# Patient Record
Sex: FEMALE | Race: WHITE | NOT HISPANIC OR LATINO | URBAN - METROPOLITAN AREA
[De-identification: names, ages, dates, MRNs, and addresses within clinical notes are randomized per-mention and may not be internally consistent; named-entity substitution may affect disease eponyms.]

---

## 2018-03-28 ENCOUNTER — TELEPHONE (OUTPATIENT)
Dept: FAMILY MEDICINE | Facility: CLINIC | Age: 23
End: 2018-03-28

## 2018-03-28 RX ORDER — TIMOLOL MALEATE 10 MG/1
1 TABLET ORAL DAILY
COMMUNITY
Start: 2018-01-31 | End: 2018-03-28 | Stop reason: SDUPTHER

## 2018-03-28 RX ORDER — TIMOLOL MALEATE 10 MG/1
10 TABLET ORAL DAILY
Qty: 90 TABLET | Refills: 1 | Status: SHIPPED | OUTPATIENT
Start: 2018-03-28 | End: 2018-10-08

## 2018-10-08 ENCOUNTER — OFFICE VISIT (OUTPATIENT)
Dept: FAMILY MEDICINE | Facility: CLINIC | Age: 23
End: 2018-10-08
Payer: OTHER GOVERNMENT

## 2018-10-08 VITALS
SYSTOLIC BLOOD PRESSURE: 126 MMHG | TEMPERATURE: 98.5 F | BODY MASS INDEX: 22.66 KG/M2 | OXYGEN SATURATION: 99 % | WEIGHT: 141 LBS | HEIGHT: 66 IN | DIASTOLIC BLOOD PRESSURE: 76 MMHG

## 2018-10-08 DIAGNOSIS — Z00.00 ENCOUNTER FOR GENERAL ADULT MEDICAL EXAMINATION WITHOUT ABNORMAL FINDINGS: Primary | ICD-10-CM

## 2018-10-08 PROCEDURE — 99395 PREV VISIT EST AGE 18-39: CPT | Performed by: FAMILY MEDICINE

## 2018-10-08 RX ORDER — NORGESTIMATE AND ETHINYL ESTRADIOL 7DAYSX3 28
KIT ORAL
COMMUNITY
Start: 2018-08-03 | End: 2020-02-04

## 2018-10-08 ASSESSMENT — ENCOUNTER SYMPTOMS
PALPITATIONS: 0
DIZZINESS: 0
FEVER: 0
EYE PAIN: 0
CONSTIPATION: 0
SORE THROAT: 0
WHEEZING: 0
SHORTNESS OF BREATH: 0
CHILLS: 0
HEMATURIA: 0
NAUSEA: 0
VOICE CHANGE: 0
NUMBNESS: 0
DYSURIA: 0
SINUS PAIN: 0
BACK PAIN: 0
COUGH: 0
NERVOUS/ANXIOUS: 0
VOMITING: 0
DIARRHEA: 0
ABDOMINAL PAIN: 0

## 2018-10-08 NOTE — PROGRESS NOTES
Subjective      Patient ID: Anahi Mcgrath is a 23 y.o. female.      HPI  Patient presents today for physical exam.    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      Social History Main Topics   • Smoking status: Never Smoker   • Smokeless tobacco: Never Used   • Alcohol use 0.6 oz/week     1 Glasses of wine per week      Comment: social   • Drug use: No   • Sexual activity: Yes     Partners: Male     Birth control/ protection: Other     Other Topics Concern   • Not on file     Social History Narrative   • No narrative on file       Family History   Problem Relation Age of Onset   • Arthritis Mother    • Pancreatic cancer Paternal Grandmother        Past Surgical History:   Procedure Laterality Date   • PILONIDAL CYST / SINUS EXCISION         History reviewed. No pertinent past medical history.    Goals     None          The following have been reviewed and updated as appropriate in this visit:  Tobacco  Med Hx  Surg Hx  Fam Hx  Soc Hx      Review of Systems   Constitutional: Negative for chills and fever.   HENT: Negative for ear pain, hearing loss, sinus pain, sneezing, sore throat and voice change.    Eyes: Negative for pain.   Respiratory: Negative for cough, shortness of breath and wheezing.    Cardiovascular: Negative for chest pain and palpitations.   Gastrointestinal: Negative for abdominal pain, constipation, diarrhea, nausea and vomiting.   Genitourinary: Negative for dysuria, hematuria, urgency and vaginal discharge.   Musculoskeletal: Negative for back pain.   Skin: Negative for rash.   Neurological: Negative for dizziness and numbness.   Psychiatric/Behavioral: The patient is not nervous/anxious.        Objective     Vitals:    10/08/18 1413   BP: 126/76   Temp: 36.9 °C (98.5 °F)   SpO2: 99%   Weight: 64 kg (141 lb)   Height:  "1.664 m (5' 5.5\")     Body mass index is 23.11 kg/m².    Physical Exam   Constitutional: She is oriented to person, place, and time. She appears well-developed and well-nourished.   HENT:   Head: Normocephalic and atraumatic.   Right Ear: External ear normal.   Left Ear: External ear normal.   Nose: Nose normal.   Mouth/Throat: Oropharynx is clear and moist. No oropharyngeal exudate.   Eyes: EOM are normal.   Neck: Normal range of motion. Neck supple. No thyromegaly present.   Cardiovascular: Normal rate, regular rhythm and normal heart sounds.  Exam reveals no gallop and no friction rub.    No murmur heard.  Pulmonary/Chest: Effort normal and breath sounds normal. No respiratory distress. She has no wheezes. She has no rales.   Abdominal: Soft. Bowel sounds are normal. She exhibits no mass. There is no tenderness. There is no guarding.   Musculoskeletal: Normal range of motion. She exhibits no edema or tenderness.   Lymphadenopathy:     She has no cervical adenopathy.   Neurological: She is alert and oriented to person, place, and time. No cranial nerve deficit.   Skin: Skin is warm and dry. No rash noted. No erythema.   Psychiatric: She has a normal mood and affect.       Assessment/Plan   Problem List Items Addressed This Visit     None      Visit Diagnoses     Encounter for general adult medical examination without abnormal findings    -  Primary    counseled diet/nutrition and exercise        Gyn - Dr. Oconnell - w4w - has appt 10/18/18  Had flu shot already this year  "

## 2020-02-03 ENCOUNTER — TRANSCRIBE ORDERS (OUTPATIENT)
Dept: SCHEDULING | Age: 25
End: 2020-02-03

## 2020-02-03 DIAGNOSIS — N60.01 SOLITARY CYST OF RIGHT BREAST: Primary | ICD-10-CM

## 2020-02-04 ENCOUNTER — OFFICE VISIT (OUTPATIENT)
Dept: FAMILY MEDICINE | Facility: CLINIC | Age: 25
End: 2020-02-04
Payer: OTHER GOVERNMENT

## 2020-02-04 VITALS
HEART RATE: 100 BPM | WEIGHT: 123 LBS | BODY MASS INDEX: 20.49 KG/M2 | HEIGHT: 65 IN | TEMPERATURE: 98.1 F | SYSTOLIC BLOOD PRESSURE: 114 MMHG | DIASTOLIC BLOOD PRESSURE: 70 MMHG | OXYGEN SATURATION: 98 %

## 2020-02-04 DIAGNOSIS — R53.83 OTHER FATIGUE: ICD-10-CM

## 2020-02-04 DIAGNOSIS — D50.9 IRON DEFICIENCY ANEMIA, UNSPECIFIED IRON DEFICIENCY ANEMIA TYPE: ICD-10-CM

## 2020-02-04 DIAGNOSIS — Z00.00 ENCOUNTER FOR GENERAL ADULT MEDICAL EXAMINATION WITHOUT ABNORMAL FINDINGS: Primary | ICD-10-CM

## 2020-02-04 PROCEDURE — 99395 PREV VISIT EST AGE 18-39: CPT | Performed by: FAMILY MEDICINE

## 2020-02-04 RX ORDER — ERENUMAB-AOOE 140 MG/ML
INJECTION, SOLUTION SUBCUTANEOUS
COMMUNITY
Start: 2020-01-01 | End: 2021-04-05

## 2020-02-04 RX ORDER — FERROUS SULFATE 325(65) MG
TABLET ORAL
COMMUNITY

## 2020-02-04 ASSESSMENT — ENCOUNTER SYMPTOMS
DIARRHEA: 0
NECK PAIN: 0
FEVER: 0
SINUS PAIN: 0
ABDOMINAL PAIN: 0
NECK STIFFNESS: 0
DYSURIA: 0
CHILLS: 0
WHEEZING: 0
VOMITING: 0
HEMATURIA: 0
VOICE CHANGE: 0
NERVOUS/ANXIOUS: 0
SHORTNESS OF BREATH: 0
BACK PAIN: 0
EYE PAIN: 0
NAUSEA: 0
SORE THROAT: 0
DIZZINESS: 0
CONSTIPATION: 0
PALPITATIONS: 0
NUMBNESS: 0
COUGH: 0

## 2020-02-04 NOTE — PROGRESS NOTES
Subjective      Patient ID: Anahi Mcgrath is a 24 y.o. female.      HPI  Patient presents today for physical exam.    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   Social Needs   • Financial resource strain: Not on file   • Food insecurity:     Worry: Not on file     Inability: Not on file   • Transportation needs:     Medical: Not on file     Non-medical: Not on file   Tobacco Use   • Smoking status: Never Smoker   • Smokeless tobacco: Never Used   Substance and Sexual Activity   • Alcohol use: Yes     Alcohol/week: 2.0 standard drinks     Types: 2 Glasses of wine per week     Comment: social   • Drug use: No   • Sexual activity: Yes     Partners: Male     Birth control/protection: OCP   Lifestyle   • Physical activity:     Days per week: Not on file     Minutes per session: Not on file   • Stress: Not on file   Relationships   • Social connections:     Talks on phone: Not on file     Gets together: Not on file     Attends Evangelical service: Not on file     Active member of club or organization: Not on file     Attends meetings of clubs or organizations: Not on file     Relationship status: Not on file   • Intimate partner violence:     Fear of current or ex partner: Not on file     Emotionally abused: Not on file     Physically abused: Not on file     Forced sexual activity: Not on file   Other Topics Concern   • Not on file   Social History Narrative   • Not on file       Family History   Problem Relation Age of Onset   • Arthritis Biological Mother    • Pancreatic cancer Paternal Grandmother    • COPD Maternal Grandmother    • Lung cancer Maternal Grandmother         SCLC       Past Surgical History:   Procedure Laterality Date   • PILONIDAL CYST / SINUS EXCISION    "      Past Medical History:   Diagnosis Date   • Anemia 11/18/19    ferritin: 11; %sat: 14       Goals    None         The following have been reviewed and updated as appropriate in this visit:  Tobacco  Allergies  Meds  Problems  Med Hx  Surg Hx  Fam Hx  Soc Hx        Review of Systems   Constitutional: Negative for chills and fever.   HENT: Negative for ear pain, hearing loss, sinus pain, sneezing, sore throat and voice change.    Eyes: Negative for pain.   Respiratory: Negative for cough, shortness of breath and wheezing.    Cardiovascular: Negative for chest pain and palpitations.   Gastrointestinal: Negative for abdominal pain, constipation, diarrhea, nausea and vomiting.   Genitourinary: Negative for dysuria, hematuria, urgency and vaginal discharge.   Musculoskeletal: Negative for back pain, neck pain and neck stiffness.   Skin: Negative for rash.   Neurological: Negative for dizziness and numbness.   Psychiatric/Behavioral: The patient is not nervous/anxious.        Objective     Vitals:    02/04/20 1819   BP: 114/70   Pulse: 100   Temp: 36.7 °C (98.1 °F)   SpO2: 98%   Weight: 55.8 kg (123 lb)   Height: 1.651 m (5' 5\")     Body mass index is 20.47 kg/m².    Physical Exam   Constitutional: She is oriented to person, place, and time. She appears well-developed and well-nourished.   HENT:   Head: Normocephalic and atraumatic.   Right Ear: External ear normal.   Left Ear: External ear normal.   Nose: Nose normal.   Mouth/Throat: Oropharynx is clear and moist. No oropharyngeal exudate.   Eyes: EOM are normal.   Neck: Normal range of motion. Neck supple. No thyromegaly present.   Cardiovascular: Normal rate, regular rhythm and normal heart sounds. Exam reveals no gallop and no friction rub.   No murmur heard.  Pulmonary/Chest: Effort normal and breath sounds normal. No stridor. No respiratory distress. She has no wheezes.   Abdominal: Soft. Bowel sounds are normal. She exhibits no mass. There is no " tenderness. There is no guarding.   Musculoskeletal: She exhibits no edema.   Neurological: She is alert and oriented to person, place, and time.   Skin: Skin is warm and dry. No rash noted. No erythema.   Psychiatric: She has a normal mood and affect. Her behavior is normal.       Assessment/Plan   Problem List Items Addressed This Visit     None      Visit Diagnoses     Encounter for general adult medical examination without abnormal findings    -  Primary    counseled diet/nutrition and exercise    Other fatigue        Relevant Orders    TSH w reflex FT4    CBC and Differential    Ferritin    Iron and TIBC    Iron deficiency anemia, unspecified iron deficiency anemia type        Relevant Medications    ferrous sulfate 325 mg (65 mg iron) tablet    Other Relevant Orders    TSH w reflex FT4    CBC and Differential    Ferritin    Iron and TIBC        Gyn - kyleena removed yesterday (after a year), pap 2019

## 2020-02-06 PROBLEM — G43.009 MIGRAINE WITHOUT AURA AND WITHOUT STATUS MIGRAINOSUS, NOT INTRACTABLE: Status: ACTIVE | Noted: 2020-02-06

## 2020-02-21 ENCOUNTER — HOSPITAL ENCOUNTER (OUTPATIENT)
Dept: RADIOLOGY | Age: 25
Discharge: HOME | End: 2020-02-21
Attending: OBSTETRICS & GYNECOLOGY
Payer: OTHER GOVERNMENT

## 2020-02-21 DIAGNOSIS — N60.01 SOLITARY CYST OF RIGHT BREAST: ICD-10-CM

## 2020-02-21 PROCEDURE — 76642 ULTRASOUND BREAST LIMITED: CPT | Mod: RT

## 2020-07-13 ENCOUNTER — TELEPHONE (OUTPATIENT)
Dept: FAMILY MEDICINE | Facility: CLINIC | Age: 25
End: 2020-07-13

## 2020-07-15 ENCOUNTER — OFFICE VISIT (OUTPATIENT)
Dept: FAMILY MEDICINE | Facility: CLINIC | Age: 25
End: 2020-07-15
Payer: OTHER GOVERNMENT

## 2020-07-15 DIAGNOSIS — Z11.1 SCREENING FOR TUBERCULOSIS: Primary | ICD-10-CM

## 2020-07-15 PROCEDURE — 86580 TB INTRADERMAL TEST: CPT | Performed by: FAMILY MEDICINE

## 2020-07-15 PROCEDURE — 99999 PR OFFICE/OUTPT VISIT,PROCEDURE ONLY: CPT | Mod: 25 | Performed by: FAMILY MEDICINE

## 2020-07-15 NOTE — PROGRESS NOTES
Pt aware to wait in office ten to fifteen minutes after administration for any immediate reactions.  AND TO COME BACK WITHIN 48-72 HOURS TO COME BACK FOR READING.

## 2020-07-17 LAB
INDURATION: 0 MM
TB SKIN TEST: NEGATIVE

## 2021-02-05 ENCOUNTER — TELEPHONE (OUTPATIENT)
Dept: FAMILY MEDICINE | Facility: CLINIC | Age: 26
End: 2021-02-05

## 2021-02-05 NOTE — TELEPHONE ENCOUNTER
Patient called [facility] school diagnostic appointment for a PPD .      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

## 2021-02-08 DIAGNOSIS — Z11.1 SCREENING FOR TUBERCULOSIS: Primary | ICD-10-CM

## 2021-02-09 ENCOUNTER — OFFICE VISIT (OUTPATIENT)
Dept: FAMILY MEDICINE | Facility: CLINIC | Age: 26
End: 2021-02-09
Payer: OTHER GOVERNMENT

## 2021-02-09 DIAGNOSIS — Z11.1 SCREENING FOR TUBERCULOSIS: Primary | ICD-10-CM

## 2021-02-09 PROCEDURE — 86580 TB INTRADERMAL TEST: CPT | Performed by: FAMILY MEDICINE

## 2021-02-09 PROCEDURE — 99999 PR OFFICE/OUTPT VISIT,PROCEDURE ONLY: CPT | Performed by: FAMILY MEDICINE

## 2021-03-26 ENCOUNTER — TELEPHONE (OUTPATIENT)
Dept: FAMILY MEDICINE | Facility: CLINIC | Age: 26
End: 2021-03-26

## 2021-03-26 DIAGNOSIS — Z11.1 SCREENING FOR TUBERCULOSIS: ICD-10-CM

## 2021-03-26 DIAGNOSIS — Z01.84 IMMUNITY STATUS TESTING: Primary | ICD-10-CM

## 2021-03-26 NOTE — TELEPHONE ENCOUNTER
Patient called said she wanted to schedule diagnostic appointment for the antibodies titers for Hep B, MMR, varicella, Quantiferon-TB Gold Test or T-spot and the TDap. 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

## 2021-03-26 NOTE — TELEPHONE ENCOUNTER
Pt needs to go to the lab for titers - not a diagnostic appointment - labs ordered   Once the titers are back we can figure out if she needs any boosters. At that time she can get her Tdap, but we can wait for now

## 2021-03-31 LAB
GAMMA INTERFERON BACKGROUND BLD IA-ACNC: 0.03 IU/ML
HBV SURFACE AB SERPL IA-ACNC: 41 MIU/ML
M TB IFN-G BLD-IMP: NEGATIVE
M TB IFN-G CD4+ BCKGRND COR BLD-ACNC: 0.04 IU/ML
MEV IGG SER IA-ACNC: >300 AU/ML
MITOGEN IGNF BCKGRD COR BLD-ACNC: >10 IU/ML
MUV IGG SER IA-ACNC: 116 AU/ML
QUEST TB2-NIL: 0.05 IU/ML
RUBV IGG SERPL IA-ACNC: 6.33 INDEX
VZV IGG SER IA-ACNC: 3354 INDEX

## 2021-04-05 ENCOUNTER — OFFICE VISIT (OUTPATIENT)
Dept: FAMILY MEDICINE | Facility: CLINIC | Age: 26
End: 2021-04-05
Payer: COMMERCIAL

## 2021-04-05 VITALS
DIASTOLIC BLOOD PRESSURE: 70 MMHG | SYSTOLIC BLOOD PRESSURE: 114 MMHG | TEMPERATURE: 96.8 F | BODY MASS INDEX: 21.66 KG/M2 | HEART RATE: 86 BPM | WEIGHT: 130 LBS | HEIGHT: 65 IN | OXYGEN SATURATION: 98 %

## 2021-04-05 DIAGNOSIS — Z00.00 ENCOUNTER FOR GENERAL ADULT MEDICAL EXAMINATION WITHOUT ABNORMAL FINDINGS: Primary | ICD-10-CM

## 2021-04-05 DIAGNOSIS — D50.9 IRON DEFICIENCY ANEMIA, UNSPECIFIED IRON DEFICIENCY ANEMIA TYPE: ICD-10-CM

## 2021-04-05 DIAGNOSIS — G43.009 MIGRAINE WITHOUT AURA AND WITHOUT STATUS MIGRAINOSUS, NOT INTRACTABLE: ICD-10-CM

## 2021-04-05 DIAGNOSIS — L65.9 THINNING HAIR: ICD-10-CM

## 2021-04-05 PROBLEM — D18.09 HEMANGIOMA OF OTHER SITES: Status: ACTIVE | Noted: 2021-04-05

## 2021-04-05 PROCEDURE — 3008F BODY MASS INDEX DOCD: CPT | Performed by: FAMILY MEDICINE

## 2021-04-05 PROCEDURE — 99395 PREV VISIT EST AGE 18-39: CPT | Performed by: FAMILY MEDICINE

## 2021-04-05 RX ORDER — RIMEGEPANT SULFATE 75 MG/75MG
TABLET, ORALLY DISINTEGRATING ORAL
COMMUNITY
Start: 2021-02-10

## 2021-04-05 RX ORDER — FREMANEZUMAB-VFRM 225 MG/1.5ML
INJECTION SUBCUTANEOUS
COMMUNITY
Start: 2021-01-06

## 2021-04-05 NOTE — PROGRESS NOTES
Subjective      Patient ID: Anahi Mcgrath is a 26 y.o. female.      HPI  Patient presents today for physical exam.    Social History     Socioeconomic History   • Marital status: Single     Spouse name: Not on file   • Number of children: Not on file   • Years of education: Not on file   • Highest education level: Not on file   Occupational History   • Occupation: [employer]   Social Needs   • Financial resource strain: Not on file   • Food insecurity     Worry: Not on file     Inability: Not on file   • Transportation needs     Medical: Not on file     Non-medical: Not on file   Tobacco Use   • Smoking status: Never Smoker   • Smokeless tobacco: Never Used   Substance and Sexual Activity   • Alcohol use: Yes     Alcohol/week: 2.0 standard drinks     Types: 2 Glasses of wine per week     Comment: social   • Drug use: No   • Sexual activity: Yes     Partners: Male     Birth control/protection: OCP   Lifestyle   • Physical activity     Days per week: Not on file     Minutes per session: Not on file   • Stress: Not on file   Relationships   • Social connections     Talks on phone: Not on file     Gets together: Not on file     Attends Cheondoism service: Not on file     Active member of club or organization: Not on file     Attends meetings of clubs or organizations: Not on file     Relationship status: Not on file   • Intimate partner violence     Fear of current or ex partner: Not on file     Emotionally abused: Not on file     Physically abused: Not on file     Forced sexual activity: Not on file   Other Topics Concern   • Not on file   Social History Narrative   • Not on file       Family History   Problem Relation Age of Onset   • Arthritis Biological Mother    • Pancreatic cancer Paternal Grandmother    • COPD Maternal Grandmother    • Lung cancer Maternal Grandmother         SCLC       Past Surgical History:   Procedure Laterality Date   • PILONIDAL CYST / SINUS EXCISION         Past Medical History:  "  Diagnosis Date   • Anemia 11/18/19    ferritin: 11; %sat: 14       Goals    None         The following have been reviewed and updated as appropriate in this visit:  Tobacco  Allergies  Meds  Problems  Med Hx  Surg Hx  Fam Hx  Soc Hx        Review of Systems   Constitutional: Negative for activity change, appetite change, fatigue and fever.   HENT: Negative for congestion, ear pain, postnasal drip, sore throat and trouble swallowing.    Eyes: Negative for pain.   Respiratory: Negative for cough, shortness of breath and wheezing.    Cardiovascular: Negative for chest pain.   Gastrointestinal: Negative for abdominal pain, constipation, diarrhea, nausea and vomiting.   Genitourinary: Negative for frequency and hematuria.   Musculoskeletal: Negative for arthralgias and back pain.   Skin: Negative for rash.   Neurological: Negative for dizziness and numbness.   Psychiatric/Behavioral: The patient is not nervous/anxious.        Objective     Vitals:    04/05/21 0901   BP: 114/70   Pulse: 86   Temp: (!) 36 °C (96.8 °F)   SpO2: 98%   Weight: 59 kg (130 lb)   Height: 1.651 m (5' 5\")     Body mass index is 21.63 kg/m².    Physical Exam  Constitutional:       Appearance: Normal appearance. She is well-developed and normal weight.   HENT:      Head: Normocephalic and atraumatic.   Neck:      Thyroid: No thyromegaly.   Cardiovascular:      Rate and Rhythm: Normal rate and regular rhythm.      Heart sounds: Normal heart sounds. No murmur. No gallop.    Pulmonary:      Effort: Pulmonary effort is normal. No respiratory distress.      Breath sounds: Normal breath sounds. No wheezing.   Musculoskeletal:      Right lower leg: No edema.      Left lower leg: No edema.   Skin:     General: Skin is warm and dry.      Findings: No erythema or rash.      Comments: Small hemangioma left occipital region above left ear     Neurological:      General: No focal deficit present.      Mental Status: She is alert and oriented to person, " place, and time. Mental status is at baseline.   Psychiatric:         Mood and Affect: Mood normal.         Behavior: Behavior normal.         Thought Content: Thought content normal.         Judgment: Judgment normal.         Assessment/Plan   Problem List Items Addressed This Visit        Nervous    Migraine without aura and without status migrainosus, not intractable     Now on ajovy  Still getting 1-2 times a month  Following with dr. Winston         Relevant Medications    AJOVY AUTOINJECTOR 225 mg/1.5 mL auto-injector injection    NURTEC ODT 75 mg tablet,disintegrating    Other Relevant Orders    CBC and Differential    Comprehensive metabolic panel    Lipid panel    TSH w reflex FT4    Iron and TIBC    Ferritin      Other Visit Diagnoses     Encounter for general adult medical examination without abnormal findings    -  Primary    counseled diet/nutrition and exercise    Relevant Orders    CBC and Differential    Comprehensive metabolic panel    Lipid panel    TSH w reflex FT4    Iron and TIBC    Ferritin    Thinning hair        Relevant Orders    CBC and Differential    Comprehensive metabolic panel    Lipid panel    TSH w reflex FT4    Iron and TIBC    Ferritin    Iron deficiency anemia, unspecified iron deficiency anemia type        Relevant Orders    CBC and Differential    Comprehensive metabolic panel    Lipid panel    TSH w reflex FT4    Iron and TIBC    Ferritin        Gyn - Dr. Oconnell - has appt tomorrow

## 2021-04-07 ASSESSMENT — ENCOUNTER SYMPTOMS
SHORTNESS OF BREATH: 0
NERVOUS/ANXIOUS: 0
DIZZINESS: 0
HEMATURIA: 0
ARTHRALGIAS: 0
BACK PAIN: 0
ACTIVITY CHANGE: 0
APPETITE CHANGE: 0
FEVER: 0
TROUBLE SWALLOWING: 0
VOMITING: 0
NUMBNESS: 0
WHEEZING: 0
CONSTIPATION: 0
NAUSEA: 0
DIARRHEA: 0
FATIGUE: 0
EYE PAIN: 0
FREQUENCY: 0
SORE THROAT: 0
COUGH: 0
ABDOMINAL PAIN: 0

## 2021-04-07 NOTE — ASSESSMENT & PLAN NOTE
Left side of scalp - recommend following up with dermatology for removal since brushing hair can cause it to bleed

## 2021-04-16 LAB
ALBUMIN SERPL-MCNC: 4.1 G/DL (ref 3.6–5.1)
ALBUMIN/GLOB SERPL: 1.8 (CALC) (ref 1–2.5)
ALP SERPL-CCNC: 47 U/L (ref 31–125)
ALT SERPL-CCNC: 14 U/L (ref 6–29)
AST SERPL-CCNC: 19 U/L (ref 10–30)
BASOPHILS # BLD AUTO: 30 CELLS/UL (ref 0–200)
BASOPHILS NFR BLD AUTO: 0.7 %
BILIRUB SERPL-MCNC: 0.9 MG/DL (ref 0.2–1.2)
BUN SERPL-MCNC: 12 MG/DL (ref 7–25)
BUN/CREAT SERPL: NORMAL (CALC) (ref 6–22)
CALCIUM SERPL-MCNC: 9.1 MG/DL (ref 8.6–10.2)
CHLORIDE SERPL-SCNC: 106 MMOL/L (ref 98–110)
CHOLEST SERPL-MCNC: 150 MG/DL
CHOLEST/HDLC SERPL: 2.5 (CALC)
CO2 SERPL-SCNC: 26 MMOL/L (ref 20–32)
CREAT SERPL-MCNC: 0.79 MG/DL (ref 0.5–1.1)
EOSINOPHIL # BLD AUTO: 82 CELLS/UL (ref 15–500)
EOSINOPHIL NFR BLD AUTO: 1.9 %
ERYTHROCYTE [DISTWIDTH] IN BLOOD BY AUTOMATED COUNT: 12.6 % (ref 11–15)
FERRITIN SERPL-MCNC: 29 NG/ML (ref 16–154)
GLOBULIN SER CALC-MCNC: 2.3 G/DL (CALC) (ref 1.9–3.7)
GLUCOSE SERPL-MCNC: 81 MG/DL (ref 65–99)
HCT VFR BLD AUTO: 44.7 % (ref 35–45)
HDLC SERPL-MCNC: 61 MG/DL
HGB BLD-MCNC: 15.1 G/DL (ref 11.7–15.5)
IRON SATN MFR SERPL: 59 % (CALC) (ref 16–45)
IRON SERPL-MCNC: 239 MCG/DL (ref 40–190)
LDLC SERPL CALC-MCNC: 73 MG/DL (CALC)
LYMPHOCYTES # BLD AUTO: 2120 CELLS/UL (ref 850–3900)
LYMPHOCYTES NFR BLD AUTO: 49.3 %
MCH RBC QN AUTO: 32.3 PG (ref 27–33)
MCHC RBC AUTO-ENTMCNC: 33.8 G/DL (ref 32–36)
MCV RBC AUTO: 95.5 FL (ref 80–100)
MONOCYTES # BLD AUTO: 310 CELLS/UL (ref 200–950)
MONOCYTES NFR BLD AUTO: 7.2 %
NEUTROPHILS # BLD AUTO: 1759 CELLS/UL (ref 1500–7800)
NEUTROPHILS NFR BLD AUTO: 40.9 %
NONHDLC SERPL-MCNC: 89 MG/DL (CALC)
PLATELET # BLD AUTO: 213 THOUSAND/UL (ref 140–400)
PMV BLD REES-ECKER: 11.3 FL (ref 7.5–12.5)
POTASSIUM SERPL-SCNC: 4.4 MMOL/L (ref 3.5–5.3)
PROT SERPL-MCNC: 6.4 G/DL (ref 6.1–8.1)
QUEST EGFR AFRICAN AMERICAN: 120 ML/MIN/1.73M2
QUEST EGFR NON-AFR. AMERICAN: 103 ML/MIN/1.73M2
RBC # BLD AUTO: 4.68 MILLION/UL (ref 3.8–5.1)
SODIUM SERPL-SCNC: 139 MMOL/L (ref 135–146)
TIBC SERPL-MCNC: 407 MCG/DL (CALC) (ref 250–450)
TRIGL SERPL-MCNC: 76 MG/DL
TSH SERPL-ACNC: 1.56 MIU/L
WBC # BLD AUTO: 4.3 THOUSAND/UL (ref 3.8–10.8)

## 2021-05-10 ENCOUNTER — APPOINTMENT (RX ONLY)
Dept: URBAN - METROPOLITAN AREA CLINIC 26 | Facility: CLINIC | Age: 26
Setting detail: DERMATOLOGY
End: 2021-05-10

## 2021-05-10 DIAGNOSIS — D22 MELANOCYTIC NEVI: ICD-10-CM | Status: STABLE

## 2021-05-10 DIAGNOSIS — L81.3 CAFÉ AU LAIT SPOTS: ICD-10-CM

## 2021-05-10 DIAGNOSIS — D22 MELANOCYTIC NEVI: ICD-10-CM

## 2021-05-10 DIAGNOSIS — L82.1 OTHER SEBORRHEIC KERATOSIS: ICD-10-CM

## 2021-05-10 DIAGNOSIS — L57.8 OTHER SKIN CHANGES DUE TO CHRONIC EXPOSURE TO NONIONIZING RADIATION: ICD-10-CM

## 2021-05-10 DIAGNOSIS — L98.0 PYOGENIC GRANULOMA: ICD-10-CM

## 2021-05-10 PROBLEM — D22.71 MELANOCYTIC NEVI OF RIGHT LOWER LIMB, INCLUDING HIP: Status: ACTIVE | Noted: 2021-05-10

## 2021-05-10 PROBLEM — D22.9 MELANOCYTIC NEVI, UNSPECIFIED: Status: ACTIVE | Noted: 2021-05-10

## 2021-05-10 PROBLEM — D48.5 NEOPLASM OF UNCERTAIN BEHAVIOR OF SKIN: Status: ACTIVE | Noted: 2021-05-10

## 2021-05-10 PROCEDURE — ? ADDITIONAL NOTES

## 2021-05-10 PROCEDURE — 99214 OFFICE O/P EST MOD 30 MIN: CPT

## 2021-05-10 PROCEDURE — ? DEFER

## 2021-05-10 PROCEDURE — ? COUNSELING

## 2021-05-10 PROCEDURE — ? SUNSCREEN RECOMMENDATIONS

## 2021-05-10 PROCEDURE — ? PATIENT SPECIFIC COUNSELING

## 2021-05-10 PROCEDURE — ? FULL BODY SKIN EXAM

## 2021-05-10 PROCEDURE — ? SURGICAL DECISION MAKING

## 2021-05-10 ASSESSMENT — LOCATION SIMPLE DESCRIPTION DERM
LOCATION SIMPLE: RIGHT PLANTAR SURFACE
LOCATION SIMPLE: RIGHT CHEEK
LOCATION SIMPLE: LEFT LOWER BACK
LOCATION SIMPLE: SCALP

## 2021-05-10 ASSESSMENT — LOCATION DETAILED DESCRIPTION DERM
LOCATION DETAILED: LEFT INFERIOR FRONTAL SCALP
LOCATION DETAILED: RIGHT INFERIOR CENTRAL MALAR CHEEK
LOCATION DETAILED: LEFT SUPERIOR LATERAL LOWER BACK
LOCATION DETAILED: RIGHT LATERAL PLANTAR MIDFOOT

## 2021-05-10 ASSESSMENT — LOCATION ZONE DERM
LOCATION ZONE: FACE
LOCATION ZONE: SCALP
LOCATION ZONE: TRUNK
LOCATION ZONE: FEET

## 2021-05-10 NOTE — PROCEDURE: SURGICAL DECISION MAKING
Identified Risk Factors Documented?: yes
Risk Assessment Explanation (Does Not Render In The Note): Clinical determination of the probability and/or consequences of an event, such as surgery. Clinical assessment of the level of risk is affected by the nature of the event under consideration for the patient. Modifier 57 is used to indicate an Evaluation and Management (E/M) service resulted in the initial decision to perform surgery either the day before a major surgery (90 day global) or the day of a major surgery.
Discussion: Risks and Benefits : The rationale for excision was explained to the patient. The risks and benefits to therapy were discussed in detail. Specifically, the risks of infection, scarring, bleeding, dehiscence, hematoma, prolonged wound healing, incomplete removal, allergy to anesthesia, nerve injury, and recurrence were addressed. The treatment site was clearly identified and confirmed by the patient.
Complexity (Necessary For Coding; Major - 90 Day Global With Some Exceptions; Minor - 10 Day Global): minor

## 2021-05-10 NOTE — PROCEDURE: REASSURANCE
Include Location In Plan?: No
Detail Level: Simple
Additional Note: Treatment options discussed at patient patient. Patient declines tx at this time.

## 2021-05-10 NOTE — PROCEDURE: SUNSCREEN RECOMMENDATIONS
General Sunscreen Counseling: I recommended a broad spectrum sunscreen with a SPF of 30 or higher.  I explained that SPF 30 sunscreens block approximately 97 percent of the sun's harmful rays.  Sunscreens should be applied at least 15 minutes prior to expected sun exposure and then every 2 hours after that as long as sun exposure continues. If swimming or exercising sunscreen should be reapplied every 45 minutes to an hour after getting wet or sweating.  One ounce, or the equivalent of a shot glass full of sunscreen, is adequate to protect the skin not covered by a bathing suit. I also recommended a lip balm with a sunscreen as well. Sun protective clothing can be used in lieu of sunscreen but must be worn the entire time you are exposed to the sun's rays.
Detail Level: Zone
Products Recommended: such as: neutrogena sheer zinc, cerave am, la roche posay toleriane uv, la roche posay anthelios

## 2021-05-10 NOTE — PROCEDURE: DEFER
Introduction Text (Please End With A Colon): The following procedure was deferred:
Procedure To Be Performed At Next Visit: Biopsy by shave method
Instructions (Optional): Patient states going to  tomorrow, would like to defer until after for biopsy.
Detail Level: Detailed

## 2021-05-13 ENCOUNTER — APPOINTMENT (RX ONLY)
Dept: URBAN - METROPOLITAN AREA CLINIC 26 | Facility: CLINIC | Age: 26
Setting detail: DERMATOLOGY
End: 2021-05-13

## 2021-05-13 DIAGNOSIS — L98.0 PYOGENIC GRANULOMA: ICD-10-CM

## 2021-05-13 DIAGNOSIS — L65.0 TELOGEN EFFLUVIUM: ICD-10-CM | Status: RESOLVED

## 2021-05-13 PROBLEM — L65.9 NONSCARRING HAIR LOSS, UNSPECIFIED: Status: ACTIVE | Noted: 2021-05-13

## 2021-05-13 PROBLEM — D48.5 NEOPLASM OF UNCERTAIN BEHAVIOR OF SKIN: Status: ACTIVE | Noted: 2021-05-13

## 2021-05-13 PROCEDURE — ? PRESCRIPTION MEDICATION MANAGEMENT

## 2021-05-13 PROCEDURE — ? BIOPSY BY SHAVE METHOD

## 2021-05-13 PROCEDURE — 99213 OFFICE O/P EST LOW 20 MIN: CPT | Mod: 25

## 2021-05-13 PROCEDURE — ? COUNSELING

## 2021-05-13 PROCEDURE — ? ORDER TESTS

## 2021-05-13 PROCEDURE — ? ADDITIONAL NOTES

## 2021-05-13 PROCEDURE — 11102 TANGNTL BX SKIN SINGLE LES: CPT

## 2021-05-13 PROCEDURE — ? DIAGNOSIS COMMENT

## 2021-05-13 ASSESSMENT — LOCATION DETAILED DESCRIPTION DERM
LOCATION DETAILED: RIGHT MEDIAL FRONTAL SCALP
LOCATION DETAILED: LEFT INFERIOR FRONTAL SCALP

## 2021-05-13 ASSESSMENT — LOCATION SIMPLE DESCRIPTION DERM
LOCATION SIMPLE: RIGHT SCALP
LOCATION SIMPLE: SCALP

## 2021-05-13 ASSESSMENT — LOCATION ZONE DERM: LOCATION ZONE: SCALP

## 2021-05-13 NOTE — PROCEDURE: BIOPSY BY SHAVE METHOD
Detail Level: Detailed
Depth Of Biopsy: dermis
Was A Bandage Applied: Yes
Size Of Lesion In Cm: 0
Biopsy Type: H and E
Biopsy Method: Dermablade
Anesthesia Type: 1% lidocaine with epinephrine and a 1:10 solution of 8.4% sodium bicarbonate
Anesthesia Volume In Cc (Will Not Render If 0): 0.5
Hemostasis: Electrocautery
Wound Care: Petrolatum
Dressing: pressure dressing with telfa
Destruction After The Procedure: No
Type Of Destruction Used: Curettage
Curettage Text: The wound bed was treated with curettage after the biopsy was performed.
Cryotherapy Text: The wound bed was treated with cryotherapy after the biopsy was performed.
Electrodesiccation Text: The wound bed was treated with electrodesiccation after the biopsy was performed.
Electrodesiccation And Curettage Text: The wound bed was treated with electrodesiccation and curettage after the biopsy was performed.
Silver Nitrate Text: The wound bed was treated with silver nitrate after the biopsy was performed.
Lab: -[id number]
Lab Facility: [id number]
Consent: Written consent was obtained and risks were reviewed including but not limited to scarring, infection, bleeding, scabbing, incomplete removal, nerve damage and allergy to anesthesia.
Post-Care Instructions: I reviewed with the patient in detail post-care instructions. Patient is to keep the biopsy site dry overnight, and then apply bacitracin twice daily until healed. Patient may apply hydrogen peroxide soaks to remove any crusting.
Notification Instructions: Patient will be notified of biopsy results. However, patient instructed to call the office if not contacted within 2 weeks.
Billing Type: Third-Party Bill
Information: Selecting Yes will display possible errors in your note based on the variables you have selected. This validation is only offered as a suggestion for you. PLEASE NOTE THAT THE VALIDATION TEXT WILL BE REMOVED WHEN YOU FINALIZE YOUR NOTE. IF YOU WANT TO FAX A PRELIMINARY NOTE YOU WILL NEED TO TOGGLE THIS TO 'NO' IF YOU DO NOT WANT IT IN YOUR FAXED NOTE.

## 2021-05-13 NOTE — PROCEDURE: PRESCRIPTION MEDICATION MANAGEMENT
Plan: Labslip provided to patient to check vitamin D levels (patient states thyroid and iron studies checked by GYN, bloodwork was normal). \\nRecommend diet rich in protein, calcium & iron (beans, leafy vegetables, eggs).\\nOk to start 5% minoxidil foam (ie: rogaine foam) qday if desired
Detail Level: Zone
Render In Strict Bullet Format?: No

## 2021-05-13 NOTE — HPI: HAIR LOSS
Previous Labs: Yes
How Did The Hair Loss Occur?: sudden in onset
Lab Details: bloodwork for hair loss done by GYN, states results were noral
Additional History: Patient states that she noticed thinning of the hair a few months after getting a Brazilian Blowout & IUD placement 1.5 years ago. She noticed hair \"started falling out a few months later,\" had the IUD removed, notes that now her hair isn't falling out as much as before. She states she hasn't noticed much hair regrowth either.

## 2021-07-06 ENCOUNTER — TELEPHONE (OUTPATIENT)
Dept: FAMILY MEDICINE | Facility: CLINIC | Age: 26
End: 2021-07-06

## 2021-07-06 NOTE — TELEPHONE ENCOUNTER
Spoke with Mare with [facility]:    For anemia/ 4.15 DOS, D50.9 was already on original order. L65.9 was removed and D50.9 was made as primary diagnosis code.      For March DOS: Z01.84 was already used.awaiting reply on whether there is a non screening diagnosis code that can be added.